# Patient Record
Sex: FEMALE | Race: WHITE | NOT HISPANIC OR LATINO | Employment: FULL TIME | URBAN - METROPOLITAN AREA
[De-identification: names, ages, dates, MRNs, and addresses within clinical notes are randomized per-mention and may not be internally consistent; named-entity substitution may affect disease eponyms.]

---

## 2025-06-26 ENCOUNTER — OFFICE VISIT (OUTPATIENT)
Dept: OBSTETRICS AND GYNECOLOGY | Facility: CLINIC | Age: 45
End: 2025-06-26
Payer: COMMERCIAL

## 2025-06-26 ENCOUNTER — HOSPITAL ENCOUNTER (OUTPATIENT)
Dept: RADIOLOGY | Facility: CLINIC | Age: 45
Discharge: HOME | End: 2025-06-26
Payer: COMMERCIAL

## 2025-06-26 VITALS
DIASTOLIC BLOOD PRESSURE: 64 MMHG | SYSTOLIC BLOOD PRESSURE: 101 MMHG | HEIGHT: 67 IN | WEIGHT: 173.2 LBS | BODY MASS INDEX: 27.18 KG/M2

## 2025-06-26 DIAGNOSIS — Z12.31 ENCOUNTER FOR SCREENING MAMMOGRAM FOR MALIGNANT NEOPLASM OF BREAST: ICD-10-CM

## 2025-06-26 DIAGNOSIS — Z01.419 WELL WOMAN EXAM: Primary | ICD-10-CM

## 2025-06-26 DIAGNOSIS — Z11.51 ENCOUNTER FOR SCREENING FOR HUMAN PAPILLOMAVIRUS (HPV): ICD-10-CM

## 2025-06-26 DIAGNOSIS — Z12.4 CERVICAL CANCER SCREENING: ICD-10-CM

## 2025-06-26 PROCEDURE — 77067 SCR MAMMO BI INCL CAD: CPT | Performed by: RADIOLOGY

## 2025-06-26 PROCEDURE — 99386 PREV VISIT NEW AGE 40-64: CPT | Performed by: OBSTETRICS & GYNECOLOGY

## 2025-06-26 PROCEDURE — 3008F BODY MASS INDEX DOCD: CPT | Performed by: OBSTETRICS & GYNECOLOGY

## 2025-06-26 PROCEDURE — 1036F TOBACCO NON-USER: CPT | Performed by: OBSTETRICS & GYNECOLOGY

## 2025-06-26 PROCEDURE — 77063 BREAST TOMOSYNTHESIS BI: CPT | Performed by: RADIOLOGY

## 2025-06-26 PROCEDURE — 77063 BREAST TOMOSYNTHESIS BI: CPT

## 2025-06-26 ASSESSMENT — SOCIAL DETERMINANTS OF HEALTH (SDOH)
WITHIN THE LAST YEAR, HAVE YOU BEEN KICKED, HIT, SLAPPED, OR OTHERWISE PHYSICALLY HURT BY YOUR PARTNER OR EX-PARTNER?: NO
WITHIN THE LAST YEAR, HAVE YOU BEEN AFRAID OF YOUR PARTNER OR EX-PARTNER?: NO
WITHIN THE LAST YEAR, HAVE TO BEEN RAPED OR FORCED TO HAVE ANY KIND OF SEXUAL ACTIVITY BY YOUR PARTNER OR EX-PARTNER?: NO
WITHIN THE LAST YEAR, HAVE YOU BEEN HUMILIATED OR EMOTIONALLY ABUSED IN OTHER WAYS BY YOUR PARTNER OR EX-PARTNER?: YES

## 2025-06-26 ASSESSMENT — LIFESTYLE VARIABLES
HOW OFTEN DO YOU HAVE A DRINK CONTAINING ALCOHOL: NEVER
AUDIT-C TOTAL SCORE: 0
HOW MANY STANDARD DRINKS CONTAINING ALCOHOL DO YOU HAVE ON A TYPICAL DAY: PATIENT DOES NOT DRINK
SKIP TO QUESTIONS 9-10: 1
HOW OFTEN DO YOU HAVE SIX OR MORE DRINKS ON ONE OCCASION: NEVER

## 2025-06-26 ASSESSMENT — ENCOUNTER SYMPTOMS
LOSS OF SENSATION IN FEET: 0
DEPRESSION: 0
OCCASIONAL FEELINGS OF UNSTEADINESS: 0

## 2025-06-26 ASSESSMENT — PATIENT HEALTH QUESTIONNAIRE - PHQ9
1. LITTLE INTEREST OR PLEASURE IN DOING THINGS: NOT AT ALL
SUM OF ALL RESPONSES TO PHQ9 QUESTIONS 1 & 2: 0
2. FEELING DOWN, DEPRESSED OR HOPELESS: NOT AT ALL

## 2025-06-26 ASSESSMENT — PAIN SCALES - GENERAL: PAINLEVEL_OUTOF10: 0-NO PAIN

## 2025-06-26 NOTE — PATIENT INSTRUCTIONS
-Try a women's health probiotic several days a week or daily    - Try [employer]  perimenopausal support for hot flashes and night sweats and balancing mood.  Please go to the [employer] website to support that women's health company.

## 2025-06-26 NOTE — PROGRESS NOTES
ESTABLISHED ANNUAL GYN VISIT     Patient Name:  Rosette Reyes  :  1980  MR #:  [id number]  Acct #:  [id number]      ASSESSMENT/PLAN:         Counseling:  Medication education:  Education:  All new and/or current medications discussed and reviewed including side effects with patient/caregiver, Understanding:  Caregiver/Patient expressed understanding., Adherence:  Barriers to adherence identified and discussed if present,     OB/GYN Preventive:     - Pap smear indicated every 5 years if normal and otherwise low risk - Next Pap smear due Now  - Self breast exam monthly and clinical breast examination yearly discussed - Next Mammogram due Now   - Screening colonoscopy recommended starting age 45, then Q3-10 years depending on testing and family history - Next screen due 3 yrs  - Osteoporosis prevention discussion included vit d3/calcium supplements, weight-bearing exercise - DEXA scan due 67 yoa  - Genitourinary skin hygiene discussed.  - Diet/Weight management discussed.    Follow-UP:       Chief Complaint:  Annual exam    HPI:  Rosette Reyes is a 44 y.o. F  Patient's last menstrual period was 06/15/2025 (exact date). for annual exam.  Hot flashes lately    GYNH:   Yes, first onset 17   LMP:  Patient's last menstrual period was 06/15/2025 (exact date).  Menstrual cycles: Monthly, regular flow, 5 days  HPV Vaccine No.  Sexual activity No. [place] Yes.  Birth control history:  Orientation Straight    Medical History[1]    Surgical History[2]    Social History[3]     Family History[4]    OB History          3    Para   2    Term   2       0    AB   1    Living   2         SAB   1    IAB   0    Ectopic   0    Multiple   0    Live Births   2                  Prior to Admission medications    Not on File       Allergies[5]      ROS:   [facility] - WOMEN ONLY:          Breast Lump No acute changes.  Vasomotor sxs no.  Painful [place] no.  Vaginal Discharge no.       [facility] - ROS Update:         "  Unexplained Weight Change no.  Pain anywhere in your body no.  Black or bloody stools no.  Problems with urination no.  Rashes or sores no.  Sexual problems no.  Depression or anxiety problems no.  Do you feel threatened by anyone No.      OBJECTIVE:   /64   Ht 1.702 m (5' 7\")   Wt 78.6 kg (173 lb 3.2 oz)   LMP 06/15/2025 (Exact Date)   BMI 27.13 kg/m²   Body mass index is 27.13 kg/m².     Physical Exam  GENERAL:   General Appearance:  well-developed, well-nourished, no functional handicap, well-groomed.  Hygiene:  good.  Ill-appearance:  none.  Mental Status:  alert and oriented.  Speech:  clear.  Eye contact:  normal.  Appears stated age:  yes.    LUNGS:  CTAB.  Effort:  no respiratory distress.    HEART:  HRRR with S1/S2 w/o M/C/R  BREASTS: General:  no masses, no tenderness, no skin changes, no nipple abnormality, and no axillary lymphadenopathy.   ABDOMEN: Tenderness:  none.  Distention:  none.   GENITOURINARY - FEMALE: Bladder:  normal.  Pelvic support defects:  not seen .  External genitalia:  Normal.  Urethra:  Normal.  Vagina:  no lesions, moderate discharge.  Cervix/ cuff:  normal appearance .  Uterus:  normal size/shape/consistency, non tender.  Adnexa:  no masses palpated, non tender.   DERMATOLOGY:  Skin:  normal.   EXTREMITIES: Normal:  no anomalies.  Edema:  none.    NEUROLOGICAL: Orientation:  alert and oriented x 3  PSYCHOLOGY: Affect:  appropriate.  Mood:  pleasant.    Labs reviewed: Yes - Due for Pap    Imaging reviewed: Yes - No mammo      Note: This dictation was generated using Dragon voice recognition software. Please excuse any grammatical or spelling errors that may have occurred using the system.              [1] History reviewed. No pertinent past medical history.  [2]   Past Surgical History:  Procedure Laterality Date     SECTION, LOW TRANSVERSE  2006   [3]   Social History  Tobacco Use    Smoking status: Former     Current packs/day: 0.00     Average packs/day: " 1.5 packs/day for 25.0 years (37.5 ttl pk-yrs)     Types: Cigarettes     Quit date: 6/10/2024     Years since quittin.0    Smokeless tobacco: Never   Vaping Use    Vaping status: Every Day    Substances: Nicotine    Devices: Pre-filled pod   Substance Use Topics    Alcohol use: Not Currently    Drug use: Never   [4]   Family History  Problem Relation Name Age of Onset    Diabetes Father Jose natarajan    [5] No Known Allergies

## 2025-07-11 LAB
CYTOLOGY CMNT CVX/VAG CYTO-IMP: NORMAL
HPV HR 12 DNA GENITAL QL NAA+PROBE: NEGATIVE
HPV HR GENOTYPES PNL CVX NAA+PROBE: NEGATIVE
HPV16 DNA SPEC QL NAA+PROBE: NEGATIVE
HPV18 DNA SPEC QL NAA+PROBE: NEGATIVE
LAB AP HPV GENOTYPE QUESTION: YES
LAB AP HPV HR: NORMAL
LABORATORY COMMENT REPORT: NORMAL
LMP START DATE: NORMAL
MENSTRUAL HX REPORTED: NORMAL
PATH REPORT.TOTAL CANCER: NORMAL

## 2025-07-18 ENCOUNTER — RESULTS FOLLOW-UP (OUTPATIENT)
Dept: OBSTETRICS AND GYNECOLOGY | Facility: CLINIC | Age: 45
End: 2025-07-18
Payer: COMMERCIAL